# Patient Record
Sex: MALE
[De-identification: names, ages, dates, MRNs, and addresses within clinical notes are randomized per-mention and may not be internally consistent; named-entity substitution may affect disease eponyms.]

---

## 2023-03-19 ENCOUNTER — NURSE TRIAGE (OUTPATIENT)
Dept: OTHER | Facility: CLINIC | Age: 68
End: 2023-03-19

## 2023-03-20 NOTE — TELEPHONE ENCOUNTER
Location of patient: [facility] call from April with [facility]. Bryn Fernandez MRN: [id number]    Subjective: Caller states went to the store to get suppositories, he tried to get it in, wife tried, states there is a ball of stool in rectum, only attempted stool softeners at this point. Current Symptoms: rectal fullness and pain from fecal impaction    Associated Symptoms: constipation    Pain Severity: unable to rate on pain scale at this time    Temperature: no fever reported by unknown method    What has been tried: suppository, stool softner    Recommended disposition: [facility] advice provided, patient verbalizes understanding; denies any other questions or concerns. Outcome: Will attempt Sitz Bath and Suppository, if unsuccessful in relieving fecal impaction may attempt fleets enema or Go to ED for evaluation, may call back to nurse triage for additional guidance if needed.       This triage is a result of a call to the [facility]    Reason for Disposition   [1] Rectal pain or fullness from fecal impaction (rectum full of stool) AND [2] has not tried SITZ bath, suppository or enema    Protocols used: Constipation-ADULT-